# Patient Record
Sex: FEMALE | Race: WHITE | Employment: FULL TIME
[De-identification: names, ages, dates, MRNs, and addresses within clinical notes are randomized per-mention and may not be internally consistent; named-entity substitution may affect disease eponyms.]

---

## 2024-01-10 ENCOUNTER — HOSPITAL ENCOUNTER (OUTPATIENT)
Facility: HOSPITAL | Age: 24
Setting detail: RECURRING SERIES
End: 2024-01-10
Payer: COMMERCIAL

## 2024-01-17 ENCOUNTER — HOSPITAL ENCOUNTER (OUTPATIENT)
Facility: HOSPITAL | Age: 24
Setting detail: RECURRING SERIES
Discharge: HOME | End: 2024-01-20
Payer: COMMERCIAL

## 2024-01-17 PROCEDURE — 97161 PT EVAL LOW COMPLEX 20 MIN: CPT

## 2024-01-17 NOTE — PROGRESS NOTES
[facility]  [address] [place] [address]  Ph [phone]  Fx [phone]    Plan of Care/ Statement of Necessity for Physical Therapy Services    Patient name: Elana Orantes Start of Care: 2024   Referral source: Tom Layne MD : 2000    Medical Diagnosis: Other symptoms and signs involving the genitourinary system [R39.89]   Onset Date:10/1/2017    Treatment Diagnosis: Other symptoms and signs involving the genitourinary system [R39.89]   Prior Hospitalization: see medical history Provider#: [id number]   Medications: Verified on Patient summary List    Comorbidities: surgery to remove mass off of ovary at 14 yo; anxiety; on birth control; frequent UTIs    Prior Level of Function: independent, active - enjoys rock climbing           The Plan of Care and following information is based on the information from the initial evaluation.  Assessment/ key information: Patient is a 23 year old female presenting to Physical Therapy with c/o pelvic pain and constipation which is limiting ability function at previous level. Patient has pelvic pain complaints with penetration including intercourse and pelvic exam. She also reports low back and hip pain with prolonged standing and sitting. Patient presents with decreased strength, coordination, and endurance of the pelvic floor muscles and decreased strength of postural stabilizers. Patient presents with fair understanding of bladder and bowel anatomy/function, dietary irritants, and urge suppression. I feel patient would benefit from skilled therapeutic intervention to optimize highest functional level possible.      Patient will continue to benefit from skilled PT services to modify and progress therapeutic interventions, address functional mobility deficits, address ROM deficits, address strength deficits, analyze and address soft tissue restrictions, analyze and cue movement patterns, analyze and modify body
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
## 2024-01-26 ENCOUNTER — HOSPITAL ENCOUNTER (OUTPATIENT)
Facility: HOSPITAL | Age: 24
Setting detail: RECURRING SERIES
Discharge: HOME | End: 2024-01-29
Payer: COMMERCIAL

## 2024-01-26 PROCEDURE — 97530 THERAPEUTIC ACTIVITIES: CPT

## 2024-01-26 PROCEDURE — 97112 NEUROMUSCULAR REEDUCATION: CPT

## 2024-01-26 PROCEDURE — 97110 THERAPEUTIC EXERCISES: CPT

## 2024-01-26 PROCEDURE — 97535 SELF CARE MNGMENT TRAINING: CPT

## 2024-01-26 NOTE — PROGRESS NOTES
PHYSICAL / OCCUPATIONAL THERAPY - DAILY TREATMENT NOTE     Patient Name: Elana Orantes    Date: 2024    : 2000  Insurance: Payor: [employer] BETTER [facility] [place] / Plan: [employer]ET BETTER [facility] [place] / Product Type: *No Product type* /      Patient  verified Yes     Visit #   Current / Total 2 12   Time   In / Out 9:16 9:54   Pain   In / Out 1 2   Subjective Functional Status/Changes: Patient states stopped taking birth control 2024 after going to hospital  night 2024 due to dizziness, fatigue, chest pain and heart palpitation. Patient reports all testing came back negative. Scheduling an appt with cardiologist. Not working anymore due to symptoms. Patient states would like to defer internal exam due to being on menstrual cycle. Reports cramping and pelvic pain has not been as bad recently.   Changes to:  Allergies, Med Hx, Sx Hx?   yes  See subjective     TREATMENT AREA =  Other symptoms and signs involving the genitourinary system [R39.89]    OBJECTIVE      Therapeutic Procedures:  Tx Min Billable or 1:1 Min (if diff from Tx Min) Procedure, Rationale, Specifics   10  24931 Therapeutic Exercise (timed):  increase ROM, strength, coordination, balance, and proprioception to improve patient's ability to progress to PLOF and address remaining functional goals. (see flow sheet as applicable)    Details if applicable:       10  91333 Neuromuscular Re-Education (timed):  improve balance, coordination, kinesthetic sense, posture, core stability and proprioception to improve patient's ability to develop conscious control of individual muscles and awareness of position of extremities in order to progress to PLOF and address remaining functional goals. (see flow sheet as applicable)    Details if applicable:     10  81219 Therapeutic Activity (timed):  use of dynamic activities replicating functional movements to increase ROM, strength, coordination, balance, and proprioception in order to

## 2024-01-31 ENCOUNTER — HOSPITAL ENCOUNTER (OUTPATIENT)
Facility: HOSPITAL | Age: 24
Setting detail: RECURRING SERIES
Discharge: HOME | End: 2024-02-03
Payer: COMMERCIAL

## 2024-01-31 PROCEDURE — 97530 THERAPEUTIC ACTIVITIES: CPT

## 2024-01-31 PROCEDURE — 97535 SELF CARE MNGMENT TRAINING: CPT

## 2024-01-31 PROCEDURE — 97112 NEUROMUSCULAR REEDUCATION: CPT

## 2024-01-31 NOTE — PROGRESS NOTES
PHYSICAL / OCCUPATIONAL THERAPY - DAILY TREATMENT NOTE     Patient Name: Elana Orantes    Date: 2024    : 2000  Insurance: Payor: [employer] BETTER [facility] [place] / Plan: [employer] BETTER [facility] [place] / Product Type: *No Product type* /      Patient  verified Yes     Visit #   Current / Total 3 12   Time   In / Out 11:15 11:58   Pain   In / Out 3-4 3   Subjective Functional Status/Changes: Patient reports increased cramps since last night, similar to when on period and radiates to low back.   Changes to:  Allergies, Med Hx, Sx Hx?   no       TREATMENT AREA =  Other symptoms and signs involving the genitourinary system [R39.89]    OBJECTIVE      Therapeutic Procedures:  Tx Min Billable or 1:1 Min (if diff from Tx Min) Procedure, Rationale, Specifics   10  12929 Therapeutic Activity (timed):  use of dynamic activities replicating functional movements to increase ROM, strength, coordination, balance, and proprioception in order to improve patient's ability to progress to PLOF and address remaining functional goals.  (see flow sheet as applicable)    Details if applicable:       10  89779 Self Care/Home Management (timed):  improve patient knowledge and understanding of pain reducing techniques, positioning, and activity modification  to improve patient's ability to progress to PLOF and address remaining functional goals.  (see flow sheet as applicable)    Details if applicable:  diaphragmatic breathing, dilator usage   23  44289 Neuromuscular Re-Education (timed):  improve balance, coordination, kinesthetic sense, posture, core stability and proprioception to improve patient's ability to develop conscious control of individual muscles and awareness of position of extremities in order to progress to PLOF and address remaining functional goals. (see flow sheet as applicable)    Details if applicable:            Details if applicable:     43  [facility] Totals Reminder: bill using total billable min of TIMED therapeutic

## 2024-02-07 ENCOUNTER — HOSPITAL ENCOUNTER (OUTPATIENT)
Facility: HOSPITAL | Age: 24
Setting detail: RECURRING SERIES
Discharge: HOME | End: 2024-02-10
Payer: COMMERCIAL

## 2024-02-07 PROCEDURE — 97112 NEUROMUSCULAR REEDUCATION: CPT

## 2024-02-07 PROCEDURE — 97110 THERAPEUTIC EXERCISES: CPT

## 2024-02-07 PROCEDURE — 97530 THERAPEUTIC ACTIVITIES: CPT

## 2024-02-07 PROCEDURE — 97535 SELF CARE MNGMENT TRAINING: CPT

## 2024-02-07 NOTE — PROGRESS NOTES
PHYSICAL / OCCUPATIONAL THERAPY - DAILY TREATMENT NOTE     Patient Name: Elana Orantes    Date: 2024    : 2000  Insurance: Payor: [employer] BETTER [facility] [place] / Plan: AET BETTER [facility] [place] / Product Type: *No Product type* /      Patient  verified Yes     Visit #   Current / Total 4 12   Time   In / Out 10:37 11:14   Pain   In / Out 2 1   Subjective Functional Status/Changes: Patient reports pain more intermittent than constant now and some right sided low back pain   Changes to:  Allergies, Med Hx, Sx Hx?   no       TREATMENT AREA =  Other symptoms and signs involving the genitourinary system [R39.89]    OBJECTIVE      Therapeutic Procedures:  Tx Min Billable or 1:1 Min (if diff from Tx Min) Procedure, Rationale, Specifics   10  78271 Self Care/Home Management (timed):  improve patient knowledge and understanding of pain reducing techniques, positioning, posture/ergonomics, and activity modification  to improve patient's ability to progress to PLOF and address remaining functional goals.  (see flow sheet as applicable)    Details if applicable:  arlette ramirez wand, oh nut     10  00025 Neuromuscular Re-Education (timed):  improve balance, coordination, kinesthetic sense, posture, core stability and proprioception to improve patient's ability to develop conscious control of individual muscles and awareness of position of extremities in order to progress to PLOF and address remaining functional goals. (see flow sheet as applicable)    Details if applicable:     9  31807 Therapeutic Activity (timed):  use of dynamic activities replicating functional movements to increase ROM, strength, coordination, balance, and proprioception in order to improve patient's ability to progress to PLOF and address remaining functional goals.  (see flow sheet as applicable)     Details if applicable:     8  28601 Therapeutic Exercise (timed):  increase ROM, strength, coordination, balance, and proprioception to improve

## 2024-02-14 ENCOUNTER — HOSPITAL ENCOUNTER (OUTPATIENT)
Facility: HOSPITAL | Age: 24
Setting detail: RECURRING SERIES
Discharge: HOME | End: 2024-02-17
Payer: COMMERCIAL

## 2024-02-14 PROCEDURE — 97535 SELF CARE MNGMENT TRAINING: CPT

## 2024-02-14 PROCEDURE — 97530 THERAPEUTIC ACTIVITIES: CPT

## 2024-02-14 PROCEDURE — 97110 THERAPEUTIC EXERCISES: CPT

## 2024-02-14 PROCEDURE — 97112 NEUROMUSCULAR REEDUCATION: CPT

## 2024-02-14 NOTE — PROGRESS NOTES
[facility]  [address] [place] [address]  Ph [phone]  Fx [phone]    Physical Therapy Progress Note  Patient name: Elana Orantes Start of Care: 2024   Referral source: Tom Layne MD : 2000               Medical Diagnosis: Other symptoms and signs involving the genitourinary system [R39.89]    Onset Date:10/1/2017               Treatment Diagnosis: Other symptoms and signs involving the genitourinary system [R39.89]   Prior Hospitalization: see medical history Provider#: [id number]   Medications: Verified on Patient summary List    Comorbidities: surgery to remove mass off of ovary at 14 yo; anxiety; on birth control; frequent UTIs    Prior Level of Function: independent, active - enjoys rock climbing    Visits from Start of Care: 5    Missed Visits: 0    Updated Goals/Measure of Progress:     Short term goals: To be achieved in 8 treatments::  MET Patient will be able to walk/stand longer than 1 hr with decreased pelvic pain, no more than 5/10 in order to improve ability to work full shift without pain.  Status at eval: patient walk/stand less than 1 hr with VAS pain rating 10/10  PN 2/14: patient able to walk/stand for 1 hour before onset of pain, typically 2/10 MET     Patient will be able to use management tools/exercises to reduce pelvic pain following walk/exercise/work to less than VAS 1/10  Status at eval: work duties and ADLs 30 minutes cause pelvic pain 10/10 pain  PN 2/14: work duties and ADLs 1-1.5 hours cause pelvic pain 2-3/10 pain PROGRESSING     Patient will tolerate insertion of dilator, static, for 15 minutes with pain less than 4/10  Status at eval: per patient pelvic exam elicits 10/10 pain, intercourse 10/10 pain  2/7: pain with deep penetration 8/10  PN 2/14: pain with deep penetration 8/10, but happens less frequently PROGRESSING     Patient will demonstrate proper toileting posture and techniques to aid in bowel movement, and decrease 
postural abnormalities to address functional deficits and attain remaining goals.    Progress toward goals / Updated goals:  []  See Progress Note/Recertification    Short term goals: To be achieved in 8 treatments::  MET Patient will be able to walk/stand longer than 1 hr with decreased pelvic pain, no more than 5/10 in order to improve ability to work full shift without pain.  Status at eval: patient walk/stand less than 1 hr with VAS pain rating 10/10  PN 2/14: patient able to walk/stand for 1 hour before onset of pain, typically 2/10 MET     Patient will be able to use management tools/exercises to reduce pelvic pain following walk/exercise/work to less than VAS 1/10  Status at eval: work duties and ADLs 30 minutes cause pelvic pain 10/10 pain  PN 2/14: work duties and ADLs 1-1.5 hours cause pelvic pain 2-3/10 pain PROGRESSING     Patient will tolerate insertion of dilator, static, for 15 minutes with pain less than 4/10  Status at eval: per patient pelvic exam elicits 10/10 pain, intercourse 10/10 pain  2/7: pain with deep penetration 8/10  PN 2/14: pain with deep penetration 8/10, but happens less frequently PROGRESSING     Patient will demonstrate proper toileting posture and techniques to aid in bowel movement, and decrease report of pain and straining.  Status at eval: standard toileting posture, pain with BM 75% of the time 10/10 and straining present  PN 2/14: pain with BM \"maybe 20% of the time, not that often\" patient states improving fiber intake has helped; reports no longer straining    MET Pt will demonstrate proper toileting posture and perform colon massage daily for improved bowel motility to improve QOL and prevent weakening of PFM due to straining with BMs.  Status at eval: BM every 2-3days, Type 4  1/31: 1-2 BM a day most days PROGRESSING  PN 2/14: 1-2 BM a day without straining, Typically 4 MET    Long term goals: To be achieved in 12 treatments::  Patient will tolerate advanced ADLs

## 2024-02-21 ENCOUNTER — HOSPITAL ENCOUNTER (OUTPATIENT)
Facility: HOSPITAL | Age: 24
Setting detail: RECURRING SERIES
Discharge: HOME | End: 2024-02-24
Payer: COMMERCIAL

## 2024-02-21 PROCEDURE — 97535 SELF CARE MNGMENT TRAINING: CPT

## 2024-02-21 PROCEDURE — 97110 THERAPEUTIC EXERCISES: CPT

## 2024-02-21 PROCEDURE — 97530 THERAPEUTIC ACTIVITIES: CPT

## 2024-02-21 PROCEDURE — 97112 NEUROMUSCULAR REEDUCATION: CPT

## 2024-02-28 ENCOUNTER — HOSPITAL ENCOUNTER (OUTPATIENT)
Facility: HOSPITAL | Age: 24
Setting detail: RECURRING SERIES
Discharge: HOME | End: 2024-03-02
Payer: COMMERCIAL

## 2024-02-28 PROCEDURE — 97112 NEUROMUSCULAR REEDUCATION: CPT

## 2024-02-28 PROCEDURE — 97530 THERAPEUTIC ACTIVITIES: CPT

## 2024-02-28 PROCEDURE — 97110 THERAPEUTIC EXERCISES: CPT

## 2024-02-28 NOTE — PROGRESS NOTES
PHYSICAL / OCCUPATIONAL THERAPY - DAILY TREATMENT NOTE     Patient Name: Elana Orantes    Date: 2024    : 2000  Insurance: Payor: [employer] BETTER [facility] [place] / Plan: [employer] BETTER [facility] [place] / Product Type: *No Product type* /      Patient  verified Yes     Visit #   Current / Total 7 12   Time   In / Out 10:31 11:10   Pain   In / Out 0 0   Subjective Functional Status/Changes: Patient reports no new complaints and still compliant with HEP and dilator use   Changes to:  Allergies, Med Hx, Sx Hx?   no       TREATMENT AREA =  Other symptoms and signs involving the genitourinary system [R39.89]    OBJECTIVE      Therapeutic Procedures:  Tx Min Billable or 1:1 Min (if diff from Tx Min) Procedure, Rationale, Specifics   8  56935 Therapeutic Exercise (timed):  increase ROM, strength, coordination, balance, and proprioception to improve patient's ability to progress to PLOF and address remaining functional goals. (see flow sheet as applicable)    Details if applicable:       23  83459 Neuromuscular Re-Education (timed):  improve balance, coordination, kinesthetic sense, posture, core stability and proprioception to improve patient's ability to develop conscious control of individual muscles and awareness of position of extremities in order to progress to PLOF and address remaining functional goals. (see flow sheet as applicable)    Details if applicable:     8  94291 Therapeutic Activity (timed):  use of dynamic activities replicating functional movements to increase ROM, strength, coordination, balance, and proprioception in order to improve patient's ability to progress to PLOF and address remaining functional goals.  (see flow sheet as applicable)     Details if applicable:           Details if applicable:            Details if applicable:     39  [facility] Totals Reminder: bill using total billable min of TIMED therapeutic procedures (example: do not include dry needle or estim unattended, both untimed

## 2024-03-06 ENCOUNTER — HOSPITAL ENCOUNTER (OUTPATIENT)
Facility: HOSPITAL | Age: 24
Setting detail: RECURRING SERIES
Discharge: HOME | End: 2024-03-09
Payer: COMMERCIAL

## 2024-03-06 PROCEDURE — 97530 THERAPEUTIC ACTIVITIES: CPT

## 2024-03-06 PROCEDURE — 97110 THERAPEUTIC EXERCISES: CPT

## 2024-03-06 PROCEDURE — 97112 NEUROMUSCULAR REEDUCATION: CPT

## 2024-03-06 NOTE — PROGRESS NOTES
PHYSICAL / OCCUPATIONAL THERAPY - DAILY TREATMENT NOTE     Patient Name: Elana Orantes    Date: 3/6/2024    : 2000  Insurance: Payor: [employer] BETTER [facility] [place] / Plan: AET BETTER [facility] [place] / Product Type: *No Product type* /      Patient  verified Yes     Visit #   Current / Total 8 12   Time   In / Out 12:30 1:11   Pain   In / Out 6 4-5   Subjective Functional Status/Changes: Patient reports bowel routine has been helpful, rarely has pain with bowel movements now, and increase in cramping since last Friday   Changes to:  Allergies, Med Hx, Sx Hx?   no       TREATMENT AREA =  Other symptoms and signs involving the genitourinary system [R39.89]    OBJECTIVE      Therapeutic Procedures:  Tx Min Billable or 1:1 Min (if diff from Tx Min) Procedure, Rationale, Specifics   8  30388 Therapeutic Exercise (timed):  increase ROM, strength, coordination, balance, and proprioception to improve patient's ability to progress to PLOF and address remaining functional goals. (see flow sheet as applicable)    Details if applicable:       23  76448 Neuromuscular Re-Education (timed):  improve balance, coordination, kinesthetic sense, posture, core stability and proprioception to improve patient's ability to develop conscious control of individual muscles and awareness of position of extremities in order to progress to PLOF and address remaining functional goals. (see flow sheet as applicable)    Details if applicable:     10  28004 Therapeutic Activity (timed):  use of dynamic activities replicating functional movements to increase ROM, strength, coordination, balance, and proprioception in order to improve patient's ability to progress to PLOF and address remaining functional goals.  (see flow sheet as applicable)     Details if applicable:           Details if applicable:            Details if applicable:     41  [facility] Totals Reminder: bill using total billable min of TIMED therapeutic procedures (example: do not

## 2024-03-13 ENCOUNTER — HOSPITAL ENCOUNTER (OUTPATIENT)
Facility: HOSPITAL | Age: 24
Setting detail: RECURRING SERIES
Discharge: HOME | End: 2024-03-16
Payer: COMMERCIAL

## 2024-03-13 PROCEDURE — 97530 THERAPEUTIC ACTIVITIES: CPT

## 2024-03-13 PROCEDURE — 97110 THERAPEUTIC EXERCISES: CPT

## 2024-03-13 PROCEDURE — 97112 NEUROMUSCULAR REEDUCATION: CPT

## 2024-03-13 NOTE — PROGRESS NOTES
demonstrates independence with management tools & exercise program that are beneficial for current condition in order to feel comfortable with Pelvic floor PT D/C & not fear.  Eval: pt fearful of return to exercise & unaware of what activities to avoid to avoid exacerbation of current condition  1/26: HEP initiated  PN 2/14: patient reports semi compliance with HEP, states 3 times in the past week  PN 3/13: patient reports compliance 4-5 times per week PROGRESSING      MET Patient will report improved bowel movement coordination and routine evidenced by having 1-2 BM per day without pain/straining  Status at eval: patient having 3 BM per week   PN 2/14: patient reports 1-2 BM per day without straining MET    Summary of Care/ Key Functional Changes: Patient is a 23 year old female who has attended 9 Physical Therapy sessions for c/o pelvic pain and constipation which is limiting ability function at previous level. Patient demonstrates improvement in symptoms, reporting longer activity tolerance before onset of pain, significant decrease in pain with intercourse, and regularity of bowel movements without straining or pain. Patient continues to make steady progress toward goals and would benefit from continued skilled PT intervention to address remaining deficits outlined in goals below.      Patient will continue to benefit from skilled PT / OT services to modify and progress therapeutic interventions, analyze and address functional mobility deficits, analyze and address ROM deficits, analyze and address strength deficits, analyze and address soft tissue restrictions, analyze and cue for proper movement patterns, and analyze and modify for postural abnormalities to address functional deficits and attain remaining goals.      ASSESSMENT/RECOMMENDATIONS:    Continue per plan of care.     Thank you for this referral.   Shannon Rodriguez, PT 3/13/2024 10:44 AM   
lifting heavy objects, typically 2-3/10 pain NEARLY MET     MET Patient will tolerate dilator, dynamic insertion for 10 minutes (or intercourse) with pain no more than 1/10  Status at eval: per patient pelvic exam elicits 10/10 pain, intercourse 10/10 pain   PN 2/14: intercourse and dilator 3/10; pelvic exam 10/10 PROGRESSING   Current: pt is being consistent with dilator usage PROGRESSING 2/21/24  PN 3/13: uses dilator 3-4 days per week with no pain MET     MET Pt demonstrates improvement of current complaints evidenced by a 5 point  improvement in bowel FOTO score.  Eval: FOTO 55  PN 2/14: 100 MET  FOTO score=established functional score where 100=no disability     Pt demonstrates independence with management tools & exercise program that are beneficial for current condition in order to feel comfortable with Pelvic floor PT D/C & not fear.  Eval: pt fearful of return to exercise & unaware of what activities to avoid to avoid exacerbation of current condition  1/26: HEP initiated  PN 2/14: patient reports semi compliance with HEP, states 3 times in the past week  PN 3/13: patient reports compliance 4-5 times per week PROGRESSING      MET Patient will report improved bowel movement coordination and routine evidenced by having 1-2 BM per day without pain/straining  Status at eval: patient having 3 BM per week   PN 2/14: patient reports 1-2 BM per day without straining MET    PLAN  Yes  Continue plan of care  []  Upgrade activities as tolerated  []  Discharge due to :  []  Other:    Shannon Rodriguez, PT    3/13/2024    7:10 AM    Future Appointments   Date Time Provider Department Center   3/13/2024  9:50 AM Shannon Rodriguez, PT [facility]   3/20/2024 11:10 AM [facility]   3/27/2024  9:50 AM [facility]   4/3/2024 11:10 AM [facility]   4/10/2024  9:50 AM [facility]

## 2024-03-20 ENCOUNTER — HOSPITAL ENCOUNTER (OUTPATIENT)
Facility: HOSPITAL | Age: 24
Setting detail: RECURRING SERIES
Discharge: HOME | End: 2024-03-23
Payer: COMMERCIAL

## 2024-03-20 PROCEDURE — 97530 THERAPEUTIC ACTIVITIES: CPT

## 2024-03-20 PROCEDURE — 97112 NEUROMUSCULAR REEDUCATION: CPT

## 2024-03-20 PROCEDURE — 97110 THERAPEUTIC EXERCISES: CPT

## 2024-03-20 PROCEDURE — 97535 SELF CARE MNGMENT TRAINING: CPT

## 2024-03-20 NOTE — PROGRESS NOTES
[facility]  [address] [place] [address]  Ph [phone]  Fx [phone]    Physical Therapy Discharge Summary    Patient name: Elana Orantes Start of Care: 2024   Referral source: Tom Layne MD : 2000               Medical Diagnosis: Other symptoms and signs involving the genitourinary system [R39.89]    Onset Date:10/1/2017               Treatment Diagnosis: Other symptoms and signs involving the genitourinary system [R39.89]   Prior Hospitalization: see medical history Provider#: [id number]   Medications: Verified on Patient summary List    Comorbidities: surgery to remove mass off of ovary at 14 yo; anxiety; on birth control; frequent UTIs    Prior Level of Function: independent, active - enjoys rock climbing    Visits from Start of Care: 10    Missed Visits: 0    Reporting Period : 2024 to 3/20/2024    Goals/Measure of Progress:  Short term goals: To be achieved in 8 treatments::  MET Patient will be able to walk/stand longer than 1 hr with decreased pelvic pain, no more than 5/10 in order to improve ability to work full shift without pain.  Status at eval: patient walk/stand less than 1 hr with VAS pain rating 10/10  PN 2/14: patient able to walk/stand for 1 hour before onset of pain, typically 2/10 MET     Patient will be able to use management tools/exercises to reduce pelvic pain following walk/exercise/work to less than VAS 1/10  Status at eval: work duties and ADLs 30 minutes cause pelvic pain 10/10 pain  : work duties and ADLs 1-1.5 hours cause pelvic pain 2-3/10 pain PROGRESSING  PN 3/13: over an hour of ADLs before onset of pain, 2/10 NEARLY MET     MET Patient will tolerate insertion of dilator, static, for 15 minutes with pain less than 4/10  Status at eval: per patient pelvic exam elicits 10/10 pain, intercourse 10/10 pain  2/7: pain with deep penetration 8/10  PN 2/14: pain with deep penetration 8/10, but happens less frequently

## 2024-03-20 NOTE — PROGRESS NOTES
Physical Therapy Discharge Instructions    [facility]  [address] [place] [address]  Ph [phone]  Fx [phone]      Patient: Elana Orantes  : 2000      Continue Home Exercise Program 1 times per day for 6 weeks, then decrease to 3-4 times per week      Continue with    [] Ice       [] Heat           Follow up with MD:     [] Upon completion of therapy     [] As needed      Recommendations:     [x]   Return to activity with home program    []   Return to activity with the following modifications:       []Post Rehab Program    []Join Independent aquatic program     []Return to/join local gym        Additional Comments: N/A          Shannon Rodriguez, PT 3/20/2024 11:28 AM

## 2024-03-20 NOTE — PROGRESS NOTES
PHYSICAL / OCCUPATIONAL THERAPY - DAILY TREATMENT NOTE     Patient Name: Elana Orantes    Date: 3/20/2024    : 2000  Insurance: Payor: [employer] [facility] / Plan: [employer]ET BETTER [facility] / Product Type: *No Product type* /      Patient  verified Yes     Visit #   Current / Total 10 12   Time   In / Out 11:11 11:54   Pain   In / Out 1 1   Subjective Functional Status/Changes: Patient reports slight cramping with onset of menstrual cycle, states less than previously experienced. Reports feeling independent with symptom management and ready for discharge.   Changes to:  Allergies, Med Hx, Sx Hx?   no       TREATMENT AREA =  Other symptoms and signs involving the genitourinary system [R39.89]    OBJECTIVE      Therapeutic Procedures:  Tx Min Billable or 1:1 Min (if diff from Tx Min) Procedure, Rationale, Specifics   10  [id number] Therapeutic Exercise (timed):  increase ROM, strength, coordination, balance, and proprioception to improve patient's ability to progress to PLOF and address remaining functional goals. (see flow sheet as applicable)    Details if applicable:       13  [id number] Neuromuscular Re-Education (timed):  improve balance, coordination, kinesthetic sense, posture, core stability and proprioception to improve patient's ability to develop conscious control of individual muscles and awareness of position of extremities in order to progress to PLOF and address remaining functional goals. (see flow sheet as applicable)    Details if applicable:     10  [id number] Therapeutic Activity (timed):  use of dynamic activities replicating functional movements to increase ROM, strength, coordination, balance, and proprioception in order to improve patient's ability to progress to PLOF and address remaining functional goals.  (see flow sheet as applicable)     Details if applicable:     10  08472 Self Care/Home Management (timed):  improve patient knowledge and understanding of pain reducing techniques,

## 2024-03-27 ENCOUNTER — APPOINTMENT (OUTPATIENT)
Facility: HOSPITAL | Age: 24
End: 2024-03-27
Payer: COMMERCIAL